# Patient Record
Sex: FEMALE | Race: OTHER
[De-identification: names, ages, dates, MRNs, and addresses within clinical notes are randomized per-mention and may not be internally consistent; named-entity substitution may affect disease eponyms.]

---

## 2020-09-10 ENCOUNTER — HOSPITAL ENCOUNTER (INPATIENT)
Dept: HOSPITAL 15 - ER | Age: 65
LOS: 2 days | Discharge: HOME | DRG: 638 | End: 2020-09-12
Attending: INTERNAL MEDICINE | Admitting: INTERNAL MEDICINE
Payer: COMMERCIAL

## 2020-09-10 VITALS — BODY MASS INDEX: 36.13 KG/M2 | HEIGHT: 63 IN | WEIGHT: 203.93 LBS

## 2020-09-10 DIAGNOSIS — E66.9: ICD-10-CM

## 2020-09-10 DIAGNOSIS — E11.628: ICD-10-CM

## 2020-09-10 DIAGNOSIS — E11.65: ICD-10-CM

## 2020-09-10 DIAGNOSIS — L03.031: ICD-10-CM

## 2020-09-10 DIAGNOSIS — E11.621: Primary | ICD-10-CM

## 2020-09-10 DIAGNOSIS — E11.69: ICD-10-CM

## 2020-09-10 DIAGNOSIS — Z83.3: ICD-10-CM

## 2020-09-10 DIAGNOSIS — B95.61: ICD-10-CM

## 2020-09-10 DIAGNOSIS — E78.5: ICD-10-CM

## 2020-09-10 DIAGNOSIS — L97.519: ICD-10-CM

## 2020-09-10 DIAGNOSIS — M86.8X7: ICD-10-CM

## 2020-09-10 LAB
ALBUMIN SERPL-MCNC: 3.3 G/DL (ref 3.4–5)
ALP SERPL-CCNC: 111 U/L (ref 45–117)
ALT SERPL-CCNC: 18 U/L (ref 13–56)
ANION GAP SERPL CALCULATED.3IONS-SCNC: 6 MMOL/L (ref 5–15)
BILIRUB SERPL-MCNC: 0.4 MG/DL (ref 0.2–1)
BUN SERPL-MCNC: 17 MG/DL (ref 7–18)
BUN/CREAT SERPL: 20.7
CALCIUM SERPL-MCNC: 8.8 MG/DL (ref 8.5–10.1)
CHLORIDE SERPL-SCNC: 105 MMOL/L (ref 98–107)
CO2 SERPL-SCNC: 25 MMOL/L (ref 21–32)
GLUCOSE SERPL-MCNC: 360 MG/DL (ref 74–106)
HCT VFR BLD AUTO: 39 % (ref 36–46)
HGB BLD-MCNC: 12.7 G/DL (ref 12.2–16.2)
MCH RBC QN AUTO: 28.6 PG (ref 28–32)
MCV RBC AUTO: 88.1 FL (ref 80–100)
NRBC BLD QL AUTO: 0 %
POTASSIUM SERPL-SCNC: 4.2 MMOL/L (ref 3.5–5.1)
PROT SERPL-MCNC: 7.1 G/DL (ref 6.4–8.2)
SODIUM SERPL-SCNC: 136 MMOL/L (ref 136–145)

## 2020-09-10 PROCEDURE — 83036 HEMOGLOBIN GLYCOSYLATED A1C: CPT

## 2020-09-10 PROCEDURE — 87186 SC STD MICRODIL/AGAR DIL: CPT

## 2020-09-10 PROCEDURE — 80053 COMPREHEN METABOLIC PANEL: CPT

## 2020-09-10 PROCEDURE — 85730 THROMBOPLASTIN TIME PARTIAL: CPT

## 2020-09-10 PROCEDURE — 83605 ASSAY OF LACTIC ACID: CPT

## 2020-09-10 PROCEDURE — 87077 CULTURE AEROBIC IDENTIFY: CPT

## 2020-09-10 PROCEDURE — 73718 MRI LOWER EXTREMITY W/O DYE: CPT

## 2020-09-10 PROCEDURE — 81001 URINALYSIS AUTO W/SCOPE: CPT

## 2020-09-10 PROCEDURE — 85652 RBC SED RATE AUTOMATED: CPT

## 2020-09-10 PROCEDURE — 87205 SMEAR GRAM STAIN: CPT

## 2020-09-10 PROCEDURE — 36415 COLL VENOUS BLD VENIPUNCTURE: CPT

## 2020-09-10 PROCEDURE — 82962 GLUCOSE BLOOD TEST: CPT

## 2020-09-10 PROCEDURE — 80048 BASIC METABOLIC PNL TOTAL CA: CPT

## 2020-09-10 PROCEDURE — 85025 COMPLETE CBC W/AUTO DIFF WBC: CPT

## 2020-09-10 PROCEDURE — 73630 X-RAY EXAM OF FOOT: CPT

## 2020-09-10 PROCEDURE — 87040 BLOOD CULTURE FOR BACTERIA: CPT

## 2020-09-10 PROCEDURE — 93926 LOWER EXTREMITY STUDY: CPT

## 2020-09-10 PROCEDURE — 84550 ASSAY OF BLOOD/URIC ACID: CPT

## 2020-09-10 PROCEDURE — 83735 ASSAY OF MAGNESIUM: CPT

## 2020-09-10 PROCEDURE — 85610 PROTHROMBIN TIME: CPT

## 2020-09-10 RX ADMIN — SODIUM CHLORIDE SCH MLS/HR: 0.9 INJECTION, SOLUTION INTRAVENOUS at 21:47

## 2020-09-10 RX ADMIN — HUMAN INSULIN SCH UNITS: 100 INJECTION, SOLUTION SUBCUTANEOUS at 21:46

## 2020-09-10 RX ADMIN — Medication SCH STRIP: at 21:43

## 2020-09-10 RX ADMIN — CLINDAMYCIN PHOSPHATE SCH MLS/HR: 150 INJECTION, SOLUTION INTRAVENOUS at 22:13

## 2020-09-10 NOTE — NUR
Telemetry admit from [facility] admitted to Telemetry unit after SBAR received.  Patient oriented to ISAIAS delaney RN, unit west, room 272, bed A, and unit policies regarding patient care and 
visiting hours. Patient now on continuous telemetry monitoring, tele box #[id number]  and telemetry 
reading on arrival to unit is sinus rhythm.  Pictures taken of right foot. Patient placed on 
bedside oxygen, weighed by bedscale and encouraged to call if they need something. All 
questions and concerns addressed, patient verbalized understanding.

## 2020-09-11 VITALS — SYSTOLIC BLOOD PRESSURE: 106 MMHG | DIASTOLIC BLOOD PRESSURE: 65 MMHG

## 2020-09-11 VITALS — SYSTOLIC BLOOD PRESSURE: 137 MMHG | DIASTOLIC BLOOD PRESSURE: 73 MMHG

## 2020-09-11 VITALS — SYSTOLIC BLOOD PRESSURE: 125 MMHG | DIASTOLIC BLOOD PRESSURE: 72 MMHG

## 2020-09-11 VITALS — DIASTOLIC BLOOD PRESSURE: 60 MMHG | SYSTOLIC BLOOD PRESSURE: 112 MMHG

## 2020-09-11 VITALS — DIASTOLIC BLOOD PRESSURE: 62 MMHG | SYSTOLIC BLOOD PRESSURE: 111 MMHG

## 2020-09-11 RX ADMIN — Medication SCH STRIP: at 11:46

## 2020-09-11 RX ADMIN — TEMAZEPAM PRN MG: 15 CAPSULE ORAL at 21:37

## 2020-09-11 RX ADMIN — Medication SCH STRIP: at 00:01

## 2020-09-11 RX ADMIN — SODIUM CHLORIDE SCH MLS/HR: 0.9 INJECTION, SOLUTION INTRAVENOUS at 03:57

## 2020-09-11 RX ADMIN — HUMAN INSULIN SCH UNITS: 100 INJECTION, SOLUTION SUBCUTANEOUS at 04:00

## 2020-09-11 RX ADMIN — Medication SCH STRIP: at 08:00

## 2020-09-11 RX ADMIN — HUMAN INSULIN SCH UNITS: 100 INJECTION, SOLUTION SUBCUTANEOUS at 11:48

## 2020-09-11 RX ADMIN — CLINDAMYCIN PHOSPHATE SCH MLS/HR: 150 INJECTION, SOLUTION INTRAVENOUS at 13:37

## 2020-09-11 RX ADMIN — CLINDAMYCIN PHOSPHATE SCH MLS/HR: 150 INJECTION, SOLUTION INTRAVENOUS at 21:31

## 2020-09-11 RX ADMIN — ENOXAPARIN SODIUM SCH MG: 40 INJECTION SUBCUTANEOUS at 09:29

## 2020-09-11 RX ADMIN — Medication SCH STRIP: at 21:45

## 2020-09-11 RX ADMIN — HUMAN INSULIN SCH UNITS: 100 INJECTION, SOLUTION SUBCUTANEOUS at 16:53

## 2020-09-11 RX ADMIN — TEMAZEPAM PRN MG: 15 CAPSULE ORAL at 00:02

## 2020-09-11 RX ADMIN — Medication SCH STRIP: at 05:33

## 2020-09-11 RX ADMIN — CLINDAMYCIN PHOSPHATE SCH MLS/HR: 150 INJECTION, SOLUTION INTRAVENOUS at 05:58

## 2020-09-11 RX ADMIN — HUMAN INSULIN SCH UNITS: 100 INJECTION, SOLUTION SUBCUTANEOUS at 00:03

## 2020-09-11 RX ADMIN — Medication SCH STRIP: at 16:52

## 2020-09-11 NOTE — NUR
WOUND CARE NOTE:

Wound care in to see per wound care request regarding Rt foot wound that are noted on 
admission. Bedside nurse took photograph of patient's wound upon admission for reference.  
Patient is 65 years old female admitted for R Foot Cellulitis.  Patient is resting in  bed 
in Rm. 272A.  Patient is awake,alert and oriented.  Patient denies any pain at this time 
however, she reported that her Rt foot is painful to touch.  Patient is ambulatory and  self 
turning and repositioning. Her Daron score is 21.



Skin/wound assessment done with the assistance of patient's nurse, JUAN PABLO Mcnair. Noted 
patient's RLE is edematous. Her Rt foot is erythremic with 5x0.8x0.5cm open ulceration to Rt 
foot in between R#1 and R #2 interdigit down to plantar base of R great toe.  Wound is red 
with scant serous drainage, no odor noted. Pedal pulse present. Patient reported that she 
has had the R foot wound about "four to five days" from wearing a slip on sandals with a 
strap in between R#1 and R#2 toe.  Cleansed wound with NS,patted dry with gauze. Took 
specimen for wound culture and sent to lab for processing.  Applied  Thera honey gel to Rt 
foot wound and covered with Band aid.  Patient tolerated well. 



RECOMMENDATION: Nursing to continue with Daily/PRN dressing change to R foot wound per MD 
order, Podiatry consult,  elevate RLE on pillows, continue monitoring by wound care while 
patient is hospitalized.

-------------------------------------------------------------------------------

Addendum: 09/11/20 at 1436 by Lizz Gibson RN

-------------------------------------------------------------------------------

Amended: Links added.

## 2020-09-11 NOTE — NUR
CLOSING SHIFT NOTE

PATIENT HAS NO S/S OF DISTRESS/SOB OR PAIN AT THIS TIME. WILL ENDORSE CARE TO NIGHT SHIFT 
RN.

## 2020-09-12 VITALS — SYSTOLIC BLOOD PRESSURE: 112 MMHG | DIASTOLIC BLOOD PRESSURE: 55 MMHG

## 2020-09-12 VITALS — DIASTOLIC BLOOD PRESSURE: 72 MMHG | SYSTOLIC BLOOD PRESSURE: 125 MMHG

## 2020-09-12 VITALS — DIASTOLIC BLOOD PRESSURE: 62 MMHG | SYSTOLIC BLOOD PRESSURE: 115 MMHG

## 2020-09-12 LAB
ANION GAP SERPL CALCULATED.3IONS-SCNC: 7 MMOL/L (ref 5–15)
APTT PPP: 24.8 SEC (ref 23–31.2)
BUN SERPL-MCNC: 14 MG/DL (ref 7–18)
BUN/CREAT SERPL: 19.7
CALCIUM SERPL-MCNC: 8.2 MG/DL (ref 8.5–10.1)
CHLORIDE SERPL-SCNC: 105 MMOL/L (ref 98–107)
CO2 SERPL-SCNC: 25 MMOL/L (ref 21–32)
GLUCOSE SERPL-MCNC: 130 MG/DL (ref 74–106)
HCT VFR BLD AUTO: 35.6 % (ref 36–46)
HGB BLD-MCNC: 12 G/DL (ref 12.2–16.2)
INR PPP: 0.97 (ref 0.9–1.15)
MCH RBC QN AUTO: 29.7 PG (ref 28–32)
MCV RBC AUTO: 88.3 FL (ref 80–100)
NRBC BLD QL AUTO: 0 %
POTASSIUM SERPL-SCNC: 3.9 MMOL/L (ref 3.5–5.1)
SODIUM SERPL-SCNC: 137 MMOL/L (ref 136–145)

## 2020-09-12 RX ADMIN — CLINDAMYCIN PHOSPHATE SCH MLS/HR: 150 INJECTION, SOLUTION INTRAVENOUS at 04:59

## 2020-09-12 RX ADMIN — HUMAN INSULIN SCH UNITS: 100 INJECTION, SOLUTION SUBCUTANEOUS at 11:11

## 2020-09-12 RX ADMIN — Medication SCH STRIP: at 06:24

## 2020-09-12 RX ADMIN — Medication SCH STRIP: at 11:02

## 2020-09-12 RX ADMIN — ENOXAPARIN SODIUM SCH MG: 40 INJECTION SUBCUTANEOUS at 09:07

## 2020-09-12 RX ADMIN — HUMAN INSULIN SCH UNITS: 100 INJECTION, SOLUTION SUBCUTANEOUS at 06:25

## 2020-09-12 RX ADMIN — CLINDAMYCIN PHOSPHATE SCH MLS/HR: 150 INJECTION, SOLUTION INTRAVENOUS at 13:39

## 2020-09-12 NOTE — NUR
Morning note



Patient resting in bed with even and unlabored respirations, no distress noted. Instructed 
patient on POC, fall precautions and to call for assistance as needed. patient verbalized 
understanding. Fall precautions in place with call light within reach.

## 2020-09-12 NOTE — NUR
RE: Intervention/wound care



Wound care completed by AIMEE Granger RN, RN. 

-------------------------------------------------------------------------------

Addendum: 09/12/20 at 1129 by Courtney McBurney RN

-------------------------------------------------------------------------------

Amended: Links added.

## 2020-09-12 NOTE — NUR
Patient ambulated to hospital lobby



Patient refused wheelchair. Patient ambulated with a steady gait to hospitals 
accompanied by staff member. Patient reports having all personal belongings. No distress 
noted.

## 2020-09-12 NOTE — NUR
Discharge



Discharge education and paperwork provided to the patient per MD order. Patient verbalized 
understanding. Instructed patient to call PCP as well as Dr. Newsome to schedule a follow up 
appointment per MD order. Patient verbalized understanding. Office phone numbers provided to 
the patient. IV removed with aseptic technique, catheter intact. Dressing applied. Patient 
tolerated well, no trauma to site. Patient reports having all personal belongings. 
Respirations even and unlabored, no distress noted. Instructed patient to notify staff once 
transportation has arrived to hospital. Patient verbalized understanding.

## 2022-08-21 ENCOUNTER — HOSPITAL ENCOUNTER (EMERGENCY)
Dept: HOSPITAL 15 - ER | Age: 67
Discharge: HOME | End: 2022-08-21
Payer: COMMERCIAL

## 2022-08-21 VITALS — WEIGHT: 198.42 LBS | BODY MASS INDEX: 35.16 KG/M2 | HEIGHT: 63 IN

## 2022-08-21 VITALS — DIASTOLIC BLOOD PRESSURE: 70 MMHG | SYSTOLIC BLOOD PRESSURE: 131 MMHG

## 2022-08-21 DIAGNOSIS — E11.9: ICD-10-CM

## 2022-08-21 DIAGNOSIS — S90.851A: Primary | ICD-10-CM
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
## 2022-08-21 DIAGNOSIS — E78.5: ICD-10-CM

## 2022-08-21 LAB
ALBUMIN SERPL-MCNC: 3.6 G/DL (ref 3.4–5)
ALP SERPL-CCNC: 109 U/L (ref 45–117)
ALT SERPL-CCNC: 23 U/L (ref 13–56)
ANION GAP SERPL CALCULATED.3IONS-SCNC: 7 MMOL/L (ref 5–15)
BILIRUB SERPL-MCNC: 0.4 MG/DL (ref 0.2–1)
BUN SERPL-MCNC: 17 MG/DL (ref 7–18)
BUN/CREAT SERPL: 22.1
CALCIUM SERPL-MCNC: 8.8 MG/DL (ref 8.5–10.1)
CHLORIDE SERPL-SCNC: 104 MMOL/L (ref 98–107)
CO2 SERPL-SCNC: 24 MMOL/L (ref 21–32)
GLUCOSE SERPL-MCNC: 342 MG/DL (ref 74–106)
HCT VFR BLD AUTO: 38.9 % (ref 36–46)
HGB BLD-MCNC: 12.9 G/DL (ref 12.2–16.2)
MCH RBC QN AUTO: 28.6 PG (ref 28–32)
MCV RBC AUTO: 86.6 FL (ref 80–100)
NRBC BLD QL AUTO: 0 %
POTASSIUM SERPL-SCNC: 4.3 MMOL/L (ref 3.5–5.1)
PROT SERPL-MCNC: 7.1 G/DL (ref 6.4–8.2)
SODIUM SERPL-SCNC: 135 MMOL/L (ref 136–145)

## 2022-08-21 PROCEDURE — 99284 EMERGENCY DEPT VISIT MOD MDM: CPT

## 2022-08-21 PROCEDURE — 96372 THER/PROPH/DIAG INJ SC/IM: CPT

## 2022-08-21 PROCEDURE — 80053 COMPREHEN METABOLIC PANEL: CPT

## 2022-08-21 PROCEDURE — 36415 COLL VENOUS BLD VENIPUNCTURE: CPT

## 2022-08-21 PROCEDURE — 85025 COMPLETE CBC W/AUTO DIFF WBC: CPT

## 2022-08-21 PROCEDURE — 73620 X-RAY EXAM OF FOOT: CPT

## 2022-09-05 ENCOUNTER — HOSPITAL ENCOUNTER (EMERGENCY)
Dept: HOSPITAL 15 - ER | Age: 67
Discharge: HOME | End: 2022-09-05
Payer: COMMERCIAL

## 2022-09-05 VITALS — SYSTOLIC BLOOD PRESSURE: 133 MMHG | DIASTOLIC BLOOD PRESSURE: 80 MMHG

## 2022-09-05 VITALS — HEIGHT: 63 IN | BODY MASS INDEX: 35.74 KG/M2 | WEIGHT: 201.72 LBS

## 2022-09-05 DIAGNOSIS — M79.89: Primary | ICD-10-CM

## 2022-09-05 DIAGNOSIS — E78.5: ICD-10-CM

## 2022-09-05 DIAGNOSIS — E11.9: ICD-10-CM

## 2022-09-05 PROCEDURE — 73630 X-RAY EXAM OF FOOT: CPT

## 2022-09-05 PROCEDURE — 99283 EMERGENCY DEPT VISIT LOW MDM: CPT
